# Patient Record
Sex: FEMALE | Race: OTHER | NOT HISPANIC OR LATINO | URBAN - METROPOLITAN AREA
[De-identification: names, ages, dates, MRNs, and addresses within clinical notes are randomized per-mention and may not be internally consistent; named-entity substitution may affect disease eponyms.]

---

## 2018-09-19 ENCOUNTER — INPATIENT (INPATIENT)
Facility: HOSPITAL | Age: 53
LOS: 50 days | Discharge: HOME | End: 2018-11-09
Attending: PSYCHIATRY & NEUROLOGY | Admitting: PSYCHIATRY & NEUROLOGY
Payer: MEDICAID

## 2018-09-19 VITALS
DIASTOLIC BLOOD PRESSURE: 141 MMHG | OXYGEN SATURATION: 100 % | TEMPERATURE: 99 F | SYSTOLIC BLOOD PRESSURE: 208 MMHG | RESPIRATION RATE: 16 BRPM

## 2018-09-19 DIAGNOSIS — F20.9 SCHIZOPHRENIA, UNSPECIFIED: ICD-10-CM

## 2018-09-19 DIAGNOSIS — F25.9 SCHIZOAFFECTIVE DISORDER, UNSPECIFIED: ICD-10-CM

## 2018-09-19 DIAGNOSIS — R69 ILLNESS, UNSPECIFIED: ICD-10-CM

## 2018-09-19 LAB
ALBUMIN SERPL ELPH-MCNC: 4.5 G/DL — SIGNIFICANT CHANGE UP (ref 3.3–5)
ALP SERPL-CCNC: 76 U/L — SIGNIFICANT CHANGE UP (ref 40–120)
ALT FLD-CCNC: 28 U/L — SIGNIFICANT CHANGE UP (ref 4–33)
APAP SERPL-MCNC: < 15 UG/ML — LOW (ref 15–25)
AST SERPL-CCNC: 22 U/L — SIGNIFICANT CHANGE UP (ref 4–32)
BASOPHILS # BLD AUTO: 0.01 K/UL — SIGNIFICANT CHANGE UP (ref 0–0.2)
BASOPHILS NFR BLD AUTO: 0.1 % — SIGNIFICANT CHANGE UP (ref 0–2)
BILIRUB SERPL-MCNC: < 0.2 MG/DL — LOW (ref 0.2–1.2)
BUN SERPL-MCNC: 7 MG/DL — SIGNIFICANT CHANGE UP (ref 7–23)
CALCIUM SERPL-MCNC: 9.3 MG/DL — SIGNIFICANT CHANGE UP (ref 8.4–10.5)
CHLORIDE SERPL-SCNC: 103 MMOL/L — SIGNIFICANT CHANGE UP (ref 98–107)
CO2 SERPL-SCNC: 25 MMOL/L — SIGNIFICANT CHANGE UP (ref 22–31)
CREAT SERPL-MCNC: 0.62 MG/DL — SIGNIFICANT CHANGE UP (ref 0.5–1.3)
EOSINOPHIL # BLD AUTO: 0 K/UL — SIGNIFICANT CHANGE UP (ref 0–0.5)
EOSINOPHIL NFR BLD AUTO: 0 % — SIGNIFICANT CHANGE UP (ref 0–6)
ETHANOL BLD-MCNC: < 10 MG/DL — SIGNIFICANT CHANGE UP
GLUCOSE SERPL-MCNC: 168 MG/DL — HIGH (ref 70–99)
HCT VFR BLD CALC: 40.8 % — SIGNIFICANT CHANGE UP (ref 34.5–45)
HGB BLD-MCNC: 12.8 G/DL — SIGNIFICANT CHANGE UP (ref 11.5–15.5)
IMM GRANULOCYTES # BLD AUTO: 0.04 # — SIGNIFICANT CHANGE UP
IMM GRANULOCYTES NFR BLD AUTO: 0.5 % — SIGNIFICANT CHANGE UP (ref 0–1.5)
LYMPHOCYTES # BLD AUTO: 2.11 K/UL — SIGNIFICANT CHANGE UP (ref 1–3.3)
LYMPHOCYTES # BLD AUTO: 24.8 % — SIGNIFICANT CHANGE UP (ref 13–44)
MCHC RBC-ENTMCNC: 26.4 PG — LOW (ref 27–34)
MCHC RBC-ENTMCNC: 31.4 % — LOW (ref 32–36)
MCV RBC AUTO: 84.1 FL — SIGNIFICANT CHANGE UP (ref 80–100)
MONOCYTES # BLD AUTO: 0.35 K/UL — SIGNIFICANT CHANGE UP (ref 0–0.9)
MONOCYTES NFR BLD AUTO: 4.1 % — SIGNIFICANT CHANGE UP (ref 2–14)
NEUTROPHILS # BLD AUTO: 6.01 K/UL — SIGNIFICANT CHANGE UP (ref 1.8–7.4)
NEUTROPHILS NFR BLD AUTO: 70.5 % — SIGNIFICANT CHANGE UP (ref 43–77)
NRBC # FLD: 0 — SIGNIFICANT CHANGE UP
PLATELET # BLD AUTO: 304 K/UL — SIGNIFICANT CHANGE UP (ref 150–400)
PMV BLD: 12.1 FL — SIGNIFICANT CHANGE UP (ref 7–13)
POTASSIUM SERPL-MCNC: 3.9 MMOL/L — SIGNIFICANT CHANGE UP (ref 3.5–5.3)
POTASSIUM SERPL-SCNC: 3.9 MMOL/L — SIGNIFICANT CHANGE UP (ref 3.5–5.3)
PROT SERPL-MCNC: 8.2 G/DL — SIGNIFICANT CHANGE UP (ref 6–8.3)
RBC # BLD: 4.85 M/UL — SIGNIFICANT CHANGE UP (ref 3.8–5.2)
RBC # FLD: 14.7 % — HIGH (ref 10.3–14.5)
SALICYLATES SERPL-MCNC: < 5 MG/DL — LOW (ref 15–30)
SODIUM SERPL-SCNC: 143 MMOL/L — SIGNIFICANT CHANGE UP (ref 135–145)
TSH SERPL-MCNC: 1.32 UIU/ML — SIGNIFICANT CHANGE UP (ref 0.27–4.2)
WBC # BLD: 8.52 K/UL — SIGNIFICANT CHANGE UP (ref 3.8–10.5)
WBC # FLD AUTO: 8.52 K/UL — SIGNIFICANT CHANGE UP (ref 3.8–10.5)

## 2018-09-19 PROCEDURE — 99285 EMERGENCY DEPT VISIT HI MDM: CPT

## 2018-09-19 RX ORDER — AMLODIPINE BESYLATE 2.5 MG/1
5 TABLET ORAL ONCE
Qty: 0 | Refills: 0 | Status: DISCONTINUED | OUTPATIENT
Start: 2018-09-19 | End: 2018-09-19

## 2018-09-19 RX ORDER — SODIUM CHLORIDE 9 MG/ML
1000 INJECTION INTRAMUSCULAR; INTRAVENOUS; SUBCUTANEOUS ONCE
Qty: 0 | Refills: 0 | Status: DISCONTINUED | OUTPATIENT
Start: 2018-09-19 | End: 2018-09-20

## 2018-09-19 RX ORDER — HALOPERIDOL DECANOATE 100 MG/ML
5 INJECTION INTRAMUSCULAR ONCE
Qty: 0 | Refills: 0 | Status: DISCONTINUED | OUTPATIENT
Start: 2018-09-19 | End: 2018-11-09

## 2018-09-19 RX ORDER — SODIUM CHLORIDE 9 MG/ML
1000 INJECTION INTRAMUSCULAR; INTRAVENOUS; SUBCUTANEOUS ONCE
Qty: 0 | Refills: 0 | Status: COMPLETED | OUTPATIENT
Start: 2018-09-19 | End: 2018-09-19

## 2018-09-19 RX ORDER — HALOPERIDOL DECANOATE 100 MG/ML
5 INJECTION INTRAMUSCULAR EVERY 6 HOURS
Qty: 0 | Refills: 0 | Status: DISCONTINUED | OUTPATIENT
Start: 2018-09-19 | End: 2018-11-09

## 2018-09-19 RX ADMIN — SODIUM CHLORIDE 1000 MILLILITER(S): 9 INJECTION INTRAMUSCULAR; INTRAVENOUS; SUBCUTANEOUS at 21:55

## 2018-09-19 RX ADMIN — AMLODIPINE BESYLATE 5 MILLIGRAM(S): 2.5 TABLET ORAL at 14:53

## 2018-09-19 RX ADMIN — Medication 2 MILLIGRAM(S): at 17:25

## 2018-09-19 NOTE — ED PROVIDER NOTE - PROGRESS NOTE DETAILS
Pt calm. Sitting in  hallway chair. ARANZA BP elevated on electronic BP. Pt refused repeat on other arm. Denies CP/SOB/HA. FALKOWSKA. Pt labs unremarkable, asymptomatic.  , will get ECG

## 2018-09-19 NOTE — ED BEHAVIORAL HEALTH ASSESSMENT NOTE - DESCRIPTION
a bit irritable, minimally cooperative, in good behavioral control    Vital Signs Last 24 Hrs  T(C): 36.7 (19 Sep 2018 10:49), Max: 37.1 (19 Sep 2018 06:35)  T(F): 98.1 (19 Sep 2018 10:49), Max: 98.8 (19 Sep 2018 06:35)  HR: 111 (19 Sep 2018 10:49) (110 - 111)  BP: 194/101 (19 Sep 2018 10:49) (159/112 - 208/141)  BP(mean): --  RR: 16 (19 Sep 2018 10:49) (16 - 17)  SpO2: 100% (19 Sep 2018 10:49) (100% - 100%) ?hypertension? see HPI

## 2018-09-19 NOTE — ED PROVIDER NOTE - CHIEF COMPLAINT
The patient is a 53y Female complaining of The patient is a 53y Female complaining of disorganized behavior.

## 2018-09-19 NOTE — ED ADULT TRIAGE NOTE - CHIEF COMPLAINT QUOTE
pt brought from home by EMS/PD for medication non-compliant, as per EMS family called when pt did not recognize them, EMS states living situation unkempt, calm on presentation, uncooperative, refusing vitals, EMS states family denies medical history

## 2018-09-19 NOTE — ED ADULT NURSE NOTE - OBJECTIVE STATEMENT
report received from Franky churchill RN. pt brought in by ems and police for noncompliance with medications. pt currently calm and cooperative. pt whispering to herself in chair. denies SI, HI, visual or auditory hallucinations. Denies drug or alcohol use. Awaiting further orders. environment checked for safety. will continue to monitor.

## 2018-09-19 NOTE — ED PROVIDER NOTE - OBJECTIVE STATEMENT
stoped taking rispedal, daughters called house filthy, pt denies 53F unknown PMH BIBEMS after police called to residence by pt's daughter's due to concern of psych medication non-compliance with Risperdal. Reportedly patient not recognizing daughters. EMS reports home is disarray and filthy. Pt denies any medical history. Pt refuses vital signs and physical exam. Denies SI/HI/AVH. Denies tobacco/alcohol/illicit drug use.

## 2018-09-19 NOTE — ED BEHAVIORAL HEALTH ASSESSMENT NOTE - SUMMARY
54 y/o female, single, noncaregiver, lives alone, unemployed, with history of psychotic disorder, multiple past psych hospitalizations, currently noncompliant with ?Risperdal?, no known history of suicide attempts or violence, PMH ?hypertension?, no known history of substance abuse, BIBEMS activated by pt's daughter (Anuradha) for acute psychotic symptoms in context of likely medication noncompliance.    Per collateral, pt has been talking to herself, disorganized, delusional, verbally aggressive and agitated, and her home is in disarray. Pt presents internally preoccupied, thought blocked, disorganized, irritable.  Pt currently unable to care for self due to severity of psychotic symptoms and requires inpatient psychiatric admission for safety and stabilization.

## 2018-09-19 NOTE — ED BEHAVIORAL HEALTH ASSESSMENT NOTE - HPI (INCLUDE ILLNESS QUALITY, SEVERITY, DURATION, TIMING, CONTEXT, MODIFYING FACTORS, ASSOCIATED SIGNS AND SYMPTOMS)
55 y/o female 55 y/o female    On approach, pt noted to be talking to herself. Pt is minimally cooperative with interview, stating "it hurts to look up." She appears internally preoccupied and thought blocked. Pt says she was brought to the hospital because "I was sleeping and the two kids came to check on me." When asked to elaborate, she responds "I don't know. Just two kids." She does not know who called 911.     Collateral obtained from pt's daughter, Anuradha, who reports 54 y/o female    Per EMS, pt's daughter activated 911 due to concern for noncompliance with Risperdal, patient not recognizing her daughters, and pt's home is in disarray and filthy.    On approach, pt noted to be talking to herself. Pt is minimally cooperative with interview, stating "it hurts to look up." She appears internally preoccupied and thought blocked. Pt says she was brought to the hospital because "I was sleeping and the two kids came to check on me." When asked to elaborate, she responds "I don't know. Just two kids." She does not know who called 911.     Collateral obtained from pt's daughter, Anuradha, who reports 52 y/o female    Per EMS, pt's daughter activated 911 due to concern for noncompliance with Risperdal, patient not recognizing her daughters, and pt's home is in disarray and filthy.    On approach, pt noted to be talking to herself. Pt is minimally cooperative with interview, stating "it hurts to look up." She appears internally preoccupied and thought blocked. Pt says she was brought to the hospital because "I was sleeping and the two kids came to check on me." When asked to elaborate, she responds "I don't know. Just two kids." She does not know who called 911. Pt refused to answer further questions.     Per collateral from patient’s daughter Anuradha Gillette ([phone]):  "Patient is a 53 year old female, domiciled alone, unemployed, BIBEMS activated by daughter for patient’s symptoms worsening. Ms. Gillette states that she went to see the patient yesterday and she noticed that the patient’s symptoms has been worsening because she was talking to herself and laughing inappropriately. Ms. Gillette reports that the patient was telling her that she felt the bed rocking when no one was rocking her bed. Ms. Gillette states that the patient normally does not exhibit these behaviors and she believes that the patient has been non-compliant with her medications (unsure what medications the patient is currently taking). Ms. Gillette states that when she went to see the patient yesterday that the patient stated that she was unable to recognize her and tell her who she was. Ms. Gillette states that the patient has been verbally aggressive towards her and she has been shouting and yelling. Ms. Gillette states this is unlike the patient’s baseline and usually she is calm and can verbally express who she feels. She states that the patient was last hospitalized over a year and a half ago at [facility]. She denies that the patient is having SI/HI/ VH/SIB. She states that the patient the patient is not on any drugs or alcohol and she is not sure where the patient follows up for her outpatient treatment. The patient has no medical issues, allergies, or legal issues. She states that the patient’s home is in disarray and the patient usually keeps her home tidy. Ms. Gillette states that the patient’s home was not tidy and she had garbage that she did not take out of the home. She states that the patient has been eating and showering to her knowledge but was uncertain in regards to the patient’s sleeping. Ms. Gillette states she is concerned because the patient has been whispering to herself which is not her baseline." 52 y/o female, single, noncaregiver, lives alone, unemployed, with history of psychotic disorder, multiple past psych hospitalizations, currently noncompliant with ?Risperdal?, no known history of suicide attempts or violence, PMH ?hypertension?, no known history of substance abuse, BIBEMS activated by pt's daughter (Anuradha) for acute psychotic symptoms in context of likely medication noncompliance.     Per EMS, pt's daughter activated 911 due to concern for noncompliance with Risperdal, patient not recognizing her daughters, and pt's home is in disarray and filthy.    On approach, pt noted to be talking to herself. Pt is minimally cooperative with interview, stating "it hurts to look up." She appears internally preoccupied and thought blocked. Pt says she was brought to the hospital because "I was sleeping and the two kids came to check on me." When asked to elaborate, she responds "I don't know. Just two kids." She does not know who called 911. Pt refused to answer further questions.     Per collateral from patient’s daughter Anuradha Gillette ([phone]):  "Patient is a 53 year old female, domiciled alone, unemployed, BIBEMS activated by daughter for patient’s symptoms worsening. Ms. Gillette states that she went to see the patient yesterday and she noticed that the patient’s symptoms has been worsening because she was talking to herself and laughing inappropriately. Ms. Gillette reports that the patient was telling her that she felt the bed rocking when no one was rocking her bed. Ms. Gillette states that the patient normally does not exhibit these behaviors and she believes that the patient has been non-compliant with her medications (unsure what medications the patient is currently taking). Ms. Gillette states that when she went to see the patient yesterday that the patient stated that she was unable to recognize her and tell her who she was. Ms. Gillette states that the patient has been verbally aggressive towards her and she has been shouting and yelling. Ms. Gillette states this is unlike the patient’s baseline and usually she is calm and can verbally express how she feels. She states that the patient was last hospitalized over a year and a half ago at [facility]. She denies that the patient is having SI/HI/ VH/SIB. She states that the patient the patient is not on any drugs or alcohol and she is not sure where the patient follows up for her outpatient treatment. The patient has no medical issues, allergies, or legal issues. She states that the patient’s home is in disarray and the patient usually keeps her home tidy. Ms. Gillette states that the patient’s home was not tidy and she had garbage that she did not take out of the home. She states that the patient has been eating and showering to her knowledge but was uncertain in regards to the patient’s sleeping. Ms. Gillette states she is concerned because the patient has been whispering to herself which is not her baseline."

## 2018-09-19 NOTE — ED ADULT NURSE REASSESSMENT NOTE - NS ED NURSE REASSESS COMMENT FT1
pt observed talking to self disorganized at times denies Si/Hi/AVh presently comfort & safety maintained eval on going.

## 2018-09-19 NOTE — ED ADULT NURSE NOTE - NSIMPLEMENTINTERV_GEN_ALL_ED
Implemented All Universal Safety Interventions:  [place] to call system. Call bell, personal items and telephone within reach. Instruct patient to call for assistance. Room bathroom lighting operational. Non-slip footwear when patient is off stretcher. Physically safe environment: no spills, clutter or unnecessary equipment. Stretcher in lowest position, wheels locked, appropriate side rails in place.

## 2018-09-19 NOTE — ED ADULT NURSE REASSESSMENT NOTE - NS ED NURSE REASSESS COMMENT FT1
pt calm in nad denies Si/Hi presently pt tolerated dinner no c/o verbalized safety & comfort measures maintained eval on going.

## 2018-09-19 NOTE — ED BEHAVIORAL HEALTH NOTE - BEHAVIORAL HEALTH NOTE
Worker spoke to patient’s daughter Caryn Gillette ([phone]) for collateral information. All information is as per Ms. Gillette:    Patient is a 53 year old female, domiciled alone, unemployed, BIBEMS activated by daughter for patient’s symptoms worsening. Ms. Gillette states that she went to see the patient yesterday and she noticed that the patient’s symptoms has been worsening because she was talking to herself and laughing inappropriately. Ms. Gillette reports that the patient was telling her that she felt the bed rocking when no one was rocking her bed. Ms. Nain states that the patient normally does not exhibit these behaviors and she believes that the patient has been non-compliant with her medications (unsure what medications the patient is currently taking). Ms. Gillette states that when she went to see the patient yesterday that the patient stated that she was unable to recognize her and tell her who she was. Ms. Gillette states that the patient has been verbally aggressive towards her and she has been shouting and yelling. Ms. Gillette states this is unlike the patient’s baseline and usually she is calm and can verbally express who she feels. She states that the patient was last hospitalized over a year and a half ago at [facility]. She denies that the patient is having SI/HI/ VH/SIB. She states that the patient the patient is not on any drugs or alcohol and she is not sure where the patient follows up for her outpatient treatment. The patient has no medical issues, allergies, or legal issues. She states that the patient’s home is in disarray and the patient usually keeps her home tidy. Ms. Gillette states that the patient’s home was not tidy and she had garbage that she did not take out of the home. She states that the patient has been eating and showering to her knowledge but was uncertain in regards to the patient’s sleeping. Ms. Nain states she is concerned because the patient has been whispering to herself which is not her baseline. Patient will be admitted to  for further stabilization. Worker informed patient’s daughter of admission to  and provided unit information.

## 2018-09-19 NOTE — ED ADULT NURSE REASSESSMENT NOTE - NS ED NURSE REASSESS COMMENT FT1
Received report from night RN pt sitting in hallway in nad  pt denies Si/Hi/AVh presently, pt calm & cooperative  tolerated breakfast safety & comfort measures maintained  medical attending  made aware of bp awaiting further orders  eval on going.

## 2018-09-19 NOTE — ED ADULT NURSE REASSESSMENT NOTE - NS ED NURSE REASSESS COMMENT FT1
pt calm & cooperative in nad sitting in hallway  pt denies Si/Hi/AVh presently comfort & safety measures maintained eval on going.

## 2018-09-20 PROCEDURE — 99221 1ST HOSP IP/OBS SF/LOW 40: CPT

## 2018-09-20 PROCEDURE — 99223 1ST HOSP IP/OBS HIGH 75: CPT

## 2018-09-20 RX ORDER — AMLODIPINE BESYLATE 2.5 MG/1
5 TABLET ORAL ONCE
Qty: 0 | Refills: 0 | Status: COMPLETED | OUTPATIENT
Start: 2018-09-20 | End: 2018-09-20

## 2018-09-20 RX ORDER — AMLODIPINE BESYLATE 2.5 MG/1
2.5 TABLET ORAL ONCE
Qty: 0 | Refills: 0 | Status: COMPLETED | OUTPATIENT
Start: 2018-09-20 | End: 2018-09-20

## 2018-09-20 RX ORDER — AMLODIPINE BESYLATE 2.5 MG/1
2.5 TABLET ORAL DAILY
Qty: 0 | Refills: 0 | Status: DISCONTINUED | OUTPATIENT
Start: 2018-09-21 | End: 2018-09-22

## 2018-09-20 RX ORDER — RISPERIDONE 4 MG/1
1 TABLET ORAL AT BEDTIME
Qty: 0 | Refills: 0 | Status: DISCONTINUED | OUTPATIENT
Start: 2018-09-20 | End: 2018-09-21

## 2018-09-20 RX ADMIN — AMLODIPINE BESYLATE 5 MILLIGRAM(S): 2.5 TABLET ORAL at 12:08

## 2018-09-20 RX ADMIN — AMLODIPINE BESYLATE 2.5 MILLIGRAM(S): 2.5 TABLET ORAL at 01:50

## 2018-09-20 NOTE — CONSULT NOTE ADULT - SUBJECTIVE AND OBJECTIVE BOX
Patient was seen and evaluated by me at 11:45 AM on 9/20/18  HPI: Pt is 53F admitted to [facility] 9/19/18 for decompensated schizophrenia.  She had elevated BP on presentation in ED and was treated with norvasc.  BP normalized but was elevated again today.  Per chart review no medical problems known to pt's daughter. Patient denies medical problems, treatment for hypertension, and denies using any pharmacy for medications.  However, patient is psychotic, stating "you can help me when you get me an appointment in the [facility]."      PAST MEDICAL & SURGICAL HISTORY:  No pertinent past medical history (patient denies)  No significant past surgical history (patient denies)      Review of Systems:   CONSTITUTIONAL: No fever, weight loss, or fatigue  EYES: No eye pain, visual disturbances, or discharge  ENMT:  No difficulty hearing, tinnitus, vertigo; No sinus or throat pain  NECK: No pain or stiffness  RESPIRATORY: No cough, wheezing, chills or hemoptysis; No shortness of breath  CARDIOVASCULAR: No chest pain, palpitations, dizziness, or leg swelling  GASTROINTESTINAL: No abdominal or epigastric pain. No nausea, vomiting, or hematemesis; No diarrhea or constipation. No melena or hematochezia.  GENITOURINARY: No dysuria, frequency, hematuria, or incontinence  NEUROLOGICAL: No headaches, memory loss, loss of strength, numbness, or tremors  SKIN: No itching, burning, rashes, or lesions   LYMPH NODES: No enlarged glands  ENDOCRINE: No heat or cold intolerance; No hair loss  MUSCULOSKELETAL: No joint pain or swelling; No muscle, back, or extremity pain  HEME/LYMPH: No easy bruising, or bleeding gums  ALLERY AND IMMUNOLOGIC: No hives or eczema    Allergies    No Known Allergies    Intolerances        Social History: denies etoh tob idu    FAMILY HISTORY: noncontributory      MEDICATIONS  (STANDING):  amLODIPine   Tablet 2.5 milliGRAM(s) Oral daily  haloperidol    Injectable 5 milliGRAM(s) IntraMuscular once  LORazepam   Injectable 2 milliGRAM(s) IntraMuscular once  risperiDONE   Tablet 1 milliGRAM(s) Oral at bedtime    MEDICATIONS  (PRN):  haloperidol     Tablet 5 milliGRAM(s) Oral every 6 hours PRN agitation  LORazepam     Tablet 2 milliGRAM(s) Oral every 6 hours PRN Agitation      Vital Signs Last 24 Hrs  T(F): afeb  HR: 119 (20 Sep 2018 17:25) (119 - 119)  BP: 155/99 (20 Sep 2018 17:25) (155/99 - 155/99)  BP(mean): --  RR: 15  SpO2: --  CAPILLARY BLOOD GLUCOSE            PHYSICAL EXAM:  GENERAL: NAD, well-developed  HEAD:  Atraumatic, Normocephalic  EYES: EOMI, conjunctiva and sclera clear  NECK: Supple, No JVD  CHEST/LUNG: breathing unlabored, declined auscultation  HEART: declined auscultation  ABDOMEN: Nondistended  EXTREMITIES:  2+ Peripheral Pulses, No clubbing, cyanosis, or edema  NEUROLOGY: non-focal  SKIN: No rashes or lesions    LABS:                        12.8   8.52  )-----------( 304      ( 19 Sep 2018 11:32 )             40.8     09-19    143  |  103  |  7   ----------------------------<  168<H>  3.9   |  25  |  0.62    Ca    9.3      19 Sep 2018 11:32    TPro  8.2  /  Alb  4.5  /  TBili  < 0.2<L>  /  DBili  x   /  AST  22  /  ALT  28  /  AlkPhos  76  09-19              EKG(personally reviewed): , LVH         Care Discussed with Consultants/Other Providers: Dr King

## 2018-09-20 NOTE — CONSULT NOTE ADULT - ASSESSMENT
53F decompensated schizophrenia with psychosis; elevated BP    Plan:  Elevated BP: Unknown whether patient has essential hypertension.  Suggestion of LVH on EKG argues that patient may have essential hypertension.  However elevated BP may also be due to psychosis/agitation.  BP normalized in ED after low dose norvasc.  Will continue this with hold parameters.  Continue to monitor BPs.    Schizophrenia: Management per primary team.

## 2018-09-21 PROCEDURE — 99232 SBSQ HOSP IP/OBS MODERATE 35: CPT

## 2018-09-21 RX ORDER — RISPERIDONE 4 MG/1
1 TABLET ORAL AT BEDTIME
Qty: 0 | Refills: 0 | Status: DISCONTINUED | OUTPATIENT
Start: 2018-09-21 | End: 2018-09-24

## 2018-09-21 RX ADMIN — AMLODIPINE BESYLATE 2.5 MILLIGRAM(S): 2.5 TABLET ORAL at 10:05

## 2018-09-22 PROCEDURE — 99232 SBSQ HOSP IP/OBS MODERATE 35: CPT

## 2018-09-22 RX ORDER — AMLODIPINE BESYLATE 2.5 MG/1
5 TABLET ORAL DAILY
Qty: 0 | Refills: 0 | Status: DISCONTINUED | OUTPATIENT
Start: 2018-09-22 | End: 2018-11-01

## 2018-09-22 RX ADMIN — AMLODIPINE BESYLATE 5 MILLIGRAM(S): 2.5 TABLET ORAL at 10:30

## 2018-09-22 RX ADMIN — RISPERIDONE 1 MILLIGRAM(S): 4 TABLET ORAL at 21:22

## 2018-09-22 NOTE — CHART NOTE - NSCHARTNOTEFT_GEN_A_CORE
LAMBERTO called for patient with /109 on AM VS.  Patient was evaluated by hospitalist yesterday and started Norvasc 2.5mg PO daily.  Patient has been receiving PRN Norvasc in ED and on the unit to control BP prior to this as well.  Patient seen and examined.  She states she feels well and has no complaints.  Denies nausea, vomiting, double vision, headache, abdominal pain.  She refuses physical exam.  She is alert and oriented to location, month, and year.  She is irritable and refuses to state her name or say why she is in the hospital.  She has not received Norvasc dose this morning.    Temp (F): 98.6 Degrees F  Temp (C) Temp (C): 37 Degrees C  Heart Rate Heart Rate (beats/min): 119 /min  BP Systolic Systolic: 198 mm Hg  BP Diastolic Diastolic (mm Hg): 109  Respiration Rate (breaths/min) Respiration Rate (breaths/min): 16 /min    Repeat BP with manual manometer was 174/104    53F decompensated schizophrenia with psychosis; elevated BP.  There is no sign of end organ damage to suggest hypertensive emergency and BP previously normalized with Norvasc.      Plan:  Elevated BP: Unknown whether patient has essential hypertension. Increased standing Norvasc dose to 5mg daily.  Will re-evaluate BP later today.

## 2018-09-23 PROCEDURE — 99232 SBSQ HOSP IP/OBS MODERATE 35: CPT

## 2018-09-23 RX ORDER — RISPERIDONE 4 MG/1
1 TABLET ORAL ONCE
Qty: 0 | Refills: 0 | Status: COMPLETED | OUTPATIENT
Start: 2018-09-23 | End: 2018-09-23

## 2018-09-23 RX ADMIN — RISPERIDONE 1 MILLIGRAM(S): 4 TABLET ORAL at 12:18

## 2018-09-23 RX ADMIN — AMLODIPINE BESYLATE 5 MILLIGRAM(S): 2.5 TABLET ORAL at 09:21

## 2018-09-24 PROCEDURE — 99232 SBSQ HOSP IP/OBS MODERATE 35: CPT

## 2018-09-24 RX ORDER — RISPERIDONE 4 MG/1
2 TABLET ORAL AT BEDTIME
Qty: 0 | Refills: 0 | Status: DISCONTINUED | OUTPATIENT
Start: 2018-09-24 | End: 2018-09-30

## 2018-09-24 RX ADMIN — RISPERIDONE 2 MILLIGRAM(S): 4 TABLET ORAL at 21:22

## 2018-09-25 RX ADMIN — AMLODIPINE BESYLATE 5 MILLIGRAM(S): 2.5 TABLET ORAL at 10:24

## 2018-09-26 PROCEDURE — 99232 SBSQ HOSP IP/OBS MODERATE 35: CPT

## 2018-09-26 RX ADMIN — Medication 2 MILLIGRAM(S): at 00:34

## 2018-09-27 PROCEDURE — 99232 SBSQ HOSP IP/OBS MODERATE 35: CPT

## 2018-09-27 RX ORDER — RISPERIDONE 4 MG/1
1 TABLET ORAL DAILY
Qty: 0 | Refills: 0 | Status: DISCONTINUED | OUTPATIENT
Start: 2018-09-28 | End: 2018-09-30

## 2018-09-27 RX ORDER — RISPERIDONE 4 MG/1
1 TABLET ORAL ONCE
Qty: 0 | Refills: 0 | Status: COMPLETED | OUTPATIENT
Start: 2018-09-27 | End: 2018-09-27

## 2018-09-27 RX ADMIN — RISPERIDONE 1 MILLIGRAM(S): 4 TABLET ORAL at 11:18

## 2018-09-28 PROCEDURE — 99232 SBSQ HOSP IP/OBS MODERATE 35: CPT

## 2018-09-29 RX ORDER — HALOPERIDOL DECANOATE 100 MG/ML
5 INJECTION INTRAMUSCULAR ONCE
Qty: 0 | Refills: 0 | Status: DISCONTINUED | OUTPATIENT
Start: 2018-09-29 | End: 2018-11-09

## 2018-09-30 RX ORDER — RISPERIDONE 4 MG/1
2 TABLET ORAL ONCE
Qty: 0 | Refills: 0 | Status: COMPLETED | OUTPATIENT
Start: 2018-09-30 | End: 2018-09-30

## 2018-09-30 RX ORDER — RISPERIDONE 4 MG/1
3 TABLET ORAL DAILY
Qty: 0 | Refills: 0 | Status: DISCONTINUED | OUTPATIENT
Start: 2018-10-01 | End: 2018-10-16

## 2018-09-30 RX ADMIN — RISPERIDONE 1 MILLIGRAM(S): 4 TABLET ORAL at 10:18

## 2018-10-01 PROCEDURE — 99232 SBSQ HOSP IP/OBS MODERATE 35: CPT

## 2018-10-02 PROCEDURE — 99232 SBSQ HOSP IP/OBS MODERATE 35: CPT

## 2018-10-03 PROCEDURE — 99232 SBSQ HOSP IP/OBS MODERATE 35: CPT

## 2018-10-04 PROCEDURE — 99232 SBSQ HOSP IP/OBS MODERATE 35: CPT

## 2018-10-05 PROCEDURE — 99232 SBSQ HOSP IP/OBS MODERATE 35: CPT

## 2018-10-08 PROCEDURE — 99232 SBSQ HOSP IP/OBS MODERATE 35: CPT

## 2018-10-09 PROCEDURE — 99232 SBSQ HOSP IP/OBS MODERATE 35: CPT

## 2018-10-09 RX ORDER — HALOPERIDOL DECANOATE 100 MG/ML
5 INJECTION INTRAMUSCULAR ONCE
Qty: 0 | Refills: 0 | Status: COMPLETED | OUTPATIENT
Start: 2018-10-09 | End: 2018-10-09

## 2018-10-09 RX ADMIN — Medication 2 MILLIGRAM(S): at 10:56

## 2018-10-09 RX ADMIN — HALOPERIDOL DECANOATE 5 MILLIGRAM(S): 100 INJECTION INTRAMUSCULAR at 10:56

## 2018-10-10 PROCEDURE — 99232 SBSQ HOSP IP/OBS MODERATE 35: CPT

## 2018-10-11 PROCEDURE — 99232 SBSQ HOSP IP/OBS MODERATE 35: CPT

## 2018-10-12 PROCEDURE — 99232 SBSQ HOSP IP/OBS MODERATE 35: CPT

## 2018-10-15 PROCEDURE — 99232 SBSQ HOSP IP/OBS MODERATE 35: CPT

## 2018-10-15 RX ORDER — HALOPERIDOL DECANOATE 100 MG/ML
5 INJECTION INTRAMUSCULAR ONCE
Qty: 0 | Refills: 0 | Status: COMPLETED | OUTPATIENT
Start: 2018-10-15 | End: 2018-10-15

## 2018-10-15 RX ORDER — DIPHENHYDRAMINE HCL 50 MG
50 CAPSULE ORAL EVERY 6 HOURS
Qty: 0 | Refills: 0 | Status: COMPLETED | OUTPATIENT
Start: 2018-10-15 | End: 2018-10-15

## 2018-10-15 RX ADMIN — Medication 2 MILLIGRAM(S): at 15:02

## 2018-10-15 RX ADMIN — Medication 50 MILLIGRAM(S): at 15:02

## 2018-10-15 RX ADMIN — HALOPERIDOL DECANOATE 5 MILLIGRAM(S): 100 INJECTION INTRAMUSCULAR at 15:02

## 2018-10-16 PROCEDURE — 99232 SBSQ HOSP IP/OBS MODERATE 35: CPT

## 2018-10-16 RX ORDER — RISPERIDONE 4 MG/1
2 TABLET ORAL AT BEDTIME
Qty: 0 | Refills: 0 | Status: DISCONTINUED | OUTPATIENT
Start: 2018-10-16 | End: 2018-10-18

## 2018-10-16 RX ORDER — HALOPERIDOL DECANOATE 100 MG/ML
5 INJECTION INTRAMUSCULAR ONCE
Qty: 0 | Refills: 0 | Status: DISCONTINUED | OUTPATIENT
Start: 2018-10-16 | End: 2018-11-09

## 2018-10-16 RX ORDER — DIPHENHYDRAMINE HCL 50 MG
50 CAPSULE ORAL ONCE
Qty: 0 | Refills: 0 | Status: DISCONTINUED | OUTPATIENT
Start: 2018-10-16 | End: 2018-11-09

## 2018-10-16 RX ADMIN — RISPERIDONE 2 MILLIGRAM(S): 4 TABLET ORAL at 23:35

## 2018-10-17 PROCEDURE — 99232 SBSQ HOSP IP/OBS MODERATE 35: CPT

## 2018-10-17 RX ADMIN — RISPERIDONE 2 MILLIGRAM(S): 4 TABLET ORAL at 21:37

## 2018-10-18 PROCEDURE — 99232 SBSQ HOSP IP/OBS MODERATE 35: CPT

## 2018-10-18 RX ORDER — RISPERIDONE 4 MG/1
3 TABLET ORAL AT BEDTIME
Qty: 0 | Refills: 0 | Status: DISCONTINUED | OUTPATIENT
Start: 2018-10-18 | End: 2018-10-25

## 2018-10-18 RX ADMIN — RISPERIDONE 3 MILLIGRAM(S): 4 TABLET ORAL at 21:42

## 2018-10-18 NOTE — CHART NOTE - NSCHARTNOTEFT_GEN_A_CORE
54 yo F is consulted after staff notice bruising on pts R hand. Pt is disorganized and unable to specify what happened to the hand. There is no bruising or swelling noted on the R hand.  Pt is noted to have marker over her hands. Pt denies having pain. No open cuts noted. Pt not cooperative with answering questions, but pt has FROM of the R hand. Pulses and sensation intact.  Refuses any pain meds at this time.

## 2018-10-19 PROCEDURE — 99232 SBSQ HOSP IP/OBS MODERATE 35: CPT

## 2018-10-19 RX ORDER — RISPERIDONE 4 MG/1
1 TABLET ORAL DAILY
Qty: 0 | Refills: 0 | Status: DISCONTINUED | OUTPATIENT
Start: 2018-10-20 | End: 2018-10-22

## 2018-10-19 RX ADMIN — RISPERIDONE 3 MILLIGRAM(S): 4 TABLET ORAL at 22:48

## 2018-10-20 RX ADMIN — AMLODIPINE BESYLATE 5 MILLIGRAM(S): 2.5 TABLET ORAL at 09:02

## 2018-10-20 RX ADMIN — RISPERIDONE 1 MILLIGRAM(S): 4 TABLET ORAL at 09:02

## 2018-10-21 RX ADMIN — RISPERIDONE 1 MILLIGRAM(S): 4 TABLET ORAL at 10:25

## 2018-10-21 RX ADMIN — RISPERIDONE 3 MILLIGRAM(S): 4 TABLET ORAL at 21:50

## 2018-10-21 RX ADMIN — AMLODIPINE BESYLATE 5 MILLIGRAM(S): 2.5 TABLET ORAL at 10:25

## 2018-10-22 PROCEDURE — 99232 SBSQ HOSP IP/OBS MODERATE 35: CPT

## 2018-10-22 RX ORDER — RISPERIDONE 4 MG/1
3 TABLET ORAL DAILY
Qty: 0 | Refills: 0 | Status: DISCONTINUED | OUTPATIENT
Start: 2018-10-23 | End: 2018-10-26

## 2018-10-22 RX ADMIN — RISPERIDONE 1 MILLIGRAM(S): 4 TABLET ORAL at 09:54

## 2018-10-22 RX ADMIN — AMLODIPINE BESYLATE 5 MILLIGRAM(S): 2.5 TABLET ORAL at 09:54

## 2018-10-22 RX ADMIN — RISPERIDONE 3 MILLIGRAM(S): 4 TABLET ORAL at 22:44

## 2018-10-23 PROCEDURE — 99232 SBSQ HOSP IP/OBS MODERATE 35: CPT

## 2018-10-23 RX ADMIN — RISPERIDONE 3 MILLIGRAM(S): 4 TABLET ORAL at 21:45

## 2018-10-23 RX ADMIN — RISPERIDONE 3 MILLIGRAM(S): 4 TABLET ORAL at 11:29

## 2018-10-23 RX ADMIN — AMLODIPINE BESYLATE 5 MILLIGRAM(S): 2.5 TABLET ORAL at 11:29

## 2018-10-24 PROCEDURE — 99232 SBSQ HOSP IP/OBS MODERATE 35: CPT

## 2018-10-24 RX ORDER — HALOPERIDOL DECANOATE 100 MG/ML
2 INJECTION INTRAMUSCULAR ONCE
Qty: 0 | Refills: 0 | Status: COMPLETED | OUTPATIENT
Start: 2018-10-24 | End: 2018-10-24

## 2018-10-24 RX ORDER — HALOPERIDOL DECANOATE 100 MG/ML
2 INJECTION INTRAMUSCULAR AT BEDTIME
Qty: 0 | Refills: 0 | Status: DISCONTINUED | OUTPATIENT
Start: 2018-10-24 | End: 2018-10-25

## 2018-10-24 RX ORDER — HALOPERIDOL DECANOATE 100 MG/ML
2 INJECTION INTRAMUSCULAR AT BEDTIME
Qty: 0 | Refills: 0 | Status: DISCONTINUED | OUTPATIENT
Start: 2018-10-24 | End: 2018-10-24

## 2018-10-24 RX ADMIN — RISPERIDONE 3 MILLIGRAM(S): 4 TABLET ORAL at 21:23

## 2018-10-24 RX ADMIN — RISPERIDONE 3 MILLIGRAM(S): 4 TABLET ORAL at 11:06

## 2018-10-24 RX ADMIN — AMLODIPINE BESYLATE 5 MILLIGRAM(S): 2.5 TABLET ORAL at 11:06

## 2018-10-25 PROCEDURE — 99232 SBSQ HOSP IP/OBS MODERATE 35: CPT

## 2018-10-25 RX ORDER — HALOPERIDOL DECANOATE 100 MG/ML
5 INJECTION INTRAMUSCULAR AT BEDTIME
Qty: 0 | Refills: 0 | Status: DISCONTINUED | OUTPATIENT
Start: 2018-10-25 | End: 2018-11-07

## 2018-10-25 RX ADMIN — AMLODIPINE BESYLATE 5 MILLIGRAM(S): 2.5 TABLET ORAL at 09:26

## 2018-10-25 RX ADMIN — RISPERIDONE 3 MILLIGRAM(S): 4 TABLET ORAL at 09:27

## 2018-10-25 RX ADMIN — HALOPERIDOL DECANOATE 5 MILLIGRAM(S): 100 INJECTION INTRAMUSCULAR at 21:37

## 2018-10-26 PROCEDURE — 99232 SBSQ HOSP IP/OBS MODERATE 35: CPT

## 2018-10-26 RX ORDER — BENZTROPINE MESYLATE 1 MG
0.5 TABLET ORAL
Qty: 0 | Refills: 0 | Status: DISCONTINUED | OUTPATIENT
Start: 2018-10-26 | End: 2018-11-09

## 2018-10-26 RX ORDER — HALOPERIDOL DECANOATE 100 MG/ML
5 INJECTION INTRAMUSCULAR DAILY
Qty: 0 | Refills: 0 | Status: DISCONTINUED | OUTPATIENT
Start: 2018-10-27 | End: 2018-11-09

## 2018-10-26 RX ADMIN — HALOPERIDOL DECANOATE 5 MILLIGRAM(S): 100 INJECTION INTRAMUSCULAR at 21:11

## 2018-10-26 RX ADMIN — AMLODIPINE BESYLATE 5 MILLIGRAM(S): 2.5 TABLET ORAL at 09:23

## 2018-10-26 RX ADMIN — RISPERIDONE 3 MILLIGRAM(S): 4 TABLET ORAL at 09:23

## 2018-10-26 RX ADMIN — Medication 0.5 MILLIGRAM(S): at 21:11

## 2018-10-27 RX ADMIN — HALOPERIDOL DECANOATE 5 MILLIGRAM(S): 100 INJECTION INTRAMUSCULAR at 09:59

## 2018-10-27 RX ADMIN — HALOPERIDOL DECANOATE 5 MILLIGRAM(S): 100 INJECTION INTRAMUSCULAR at 22:39

## 2018-10-27 RX ADMIN — Medication 0.5 MILLIGRAM(S): at 22:39

## 2018-10-27 RX ADMIN — Medication 0.5 MILLIGRAM(S): at 09:59

## 2018-10-28 RX ADMIN — HALOPERIDOL DECANOATE 5 MILLIGRAM(S): 100 INJECTION INTRAMUSCULAR at 22:09

## 2018-10-28 RX ADMIN — Medication 0.5 MILLIGRAM(S): at 22:09

## 2018-10-28 RX ADMIN — Medication 0.5 MILLIGRAM(S): at 10:35

## 2018-10-28 RX ADMIN — HALOPERIDOL DECANOATE 5 MILLIGRAM(S): 100 INJECTION INTRAMUSCULAR at 10:37

## 2018-10-29 PROCEDURE — 99232 SBSQ HOSP IP/OBS MODERATE 35: CPT

## 2018-10-29 RX ADMIN — Medication 0.5 MILLIGRAM(S): at 08:33

## 2018-10-29 RX ADMIN — Medication 0.5 MILLIGRAM(S): at 21:30

## 2018-10-29 RX ADMIN — AMLODIPINE BESYLATE 5 MILLIGRAM(S): 2.5 TABLET ORAL at 08:33

## 2018-10-29 RX ADMIN — HALOPERIDOL DECANOATE 5 MILLIGRAM(S): 100 INJECTION INTRAMUSCULAR at 08:33

## 2018-10-29 RX ADMIN — HALOPERIDOL DECANOATE 5 MILLIGRAM(S): 100 INJECTION INTRAMUSCULAR at 21:30

## 2018-10-30 PROCEDURE — 99232 SBSQ HOSP IP/OBS MODERATE 35: CPT

## 2018-10-30 RX ADMIN — Medication 0.5 MILLIGRAM(S): at 09:13

## 2018-10-30 RX ADMIN — Medication 0.5 MILLIGRAM(S): at 21:29

## 2018-10-30 RX ADMIN — HALOPERIDOL DECANOATE 5 MILLIGRAM(S): 100 INJECTION INTRAMUSCULAR at 09:13

## 2018-10-30 RX ADMIN — HALOPERIDOL DECANOATE 5 MILLIGRAM(S): 100 INJECTION INTRAMUSCULAR at 21:29

## 2018-10-30 RX ADMIN — AMLODIPINE BESYLATE 5 MILLIGRAM(S): 2.5 TABLET ORAL at 09:13

## 2018-10-31 PROCEDURE — 99232 SBSQ HOSP IP/OBS MODERATE 35: CPT

## 2018-10-31 RX ADMIN — HALOPERIDOL DECANOATE 5 MILLIGRAM(S): 100 INJECTION INTRAMUSCULAR at 09:27

## 2018-10-31 RX ADMIN — AMLODIPINE BESYLATE 5 MILLIGRAM(S): 2.5 TABLET ORAL at 09:27

## 2018-10-31 RX ADMIN — Medication 0.5 MILLIGRAM(S): at 09:27

## 2018-10-31 RX ADMIN — HALOPERIDOL DECANOATE 5 MILLIGRAM(S): 100 INJECTION INTRAMUSCULAR at 21:11

## 2018-10-31 RX ADMIN — Medication 0.5 MILLIGRAM(S): at 21:11

## 2018-11-01 PROCEDURE — 99232 SBSQ HOSP IP/OBS MODERATE 35: CPT

## 2018-11-01 RX ORDER — AMLODIPINE BESYLATE 2.5 MG/1
10 TABLET ORAL DAILY
Qty: 0 | Refills: 0 | Status: DISCONTINUED | OUTPATIENT
Start: 2018-11-02 | End: 2018-11-09

## 2018-11-01 RX ADMIN — Medication 0.5 MILLIGRAM(S): at 21:22

## 2018-11-01 RX ADMIN — Medication 0.5 MILLIGRAM(S): at 09:31

## 2018-11-01 RX ADMIN — HALOPERIDOL DECANOATE 5 MILLIGRAM(S): 100 INJECTION INTRAMUSCULAR at 21:22

## 2018-11-01 RX ADMIN — AMLODIPINE BESYLATE 5 MILLIGRAM(S): 2.5 TABLET ORAL at 09:31

## 2018-11-01 RX ADMIN — HALOPERIDOL DECANOATE 5 MILLIGRAM(S): 100 INJECTION INTRAMUSCULAR at 09:31

## 2018-11-02 PROCEDURE — 99232 SBSQ HOSP IP/OBS MODERATE 35: CPT

## 2018-11-02 RX ADMIN — HALOPERIDOL DECANOATE 5 MILLIGRAM(S): 100 INJECTION INTRAMUSCULAR at 08:23

## 2018-11-02 RX ADMIN — Medication 0.5 MILLIGRAM(S): at 08:23

## 2018-11-02 RX ADMIN — HALOPERIDOL DECANOATE 5 MILLIGRAM(S): 100 INJECTION INTRAMUSCULAR at 21:17

## 2018-11-02 RX ADMIN — Medication 0.5 MILLIGRAM(S): at 21:17

## 2018-11-02 RX ADMIN — AMLODIPINE BESYLATE 10 MILLIGRAM(S): 2.5 TABLET ORAL at 08:23

## 2018-11-03 RX ADMIN — Medication 0.5 MILLIGRAM(S): at 21:06

## 2018-11-03 RX ADMIN — HALOPERIDOL DECANOATE 5 MILLIGRAM(S): 100 INJECTION INTRAMUSCULAR at 21:06

## 2018-11-03 RX ADMIN — AMLODIPINE BESYLATE 10 MILLIGRAM(S): 2.5 TABLET ORAL at 10:05

## 2018-11-03 RX ADMIN — Medication 0.5 MILLIGRAM(S): at 10:06

## 2018-11-03 RX ADMIN — HALOPERIDOL DECANOATE 5 MILLIGRAM(S): 100 INJECTION INTRAMUSCULAR at 10:06

## 2018-11-04 RX ADMIN — Medication 0.5 MILLIGRAM(S): at 21:01

## 2018-11-04 RX ADMIN — Medication 0.5 MILLIGRAM(S): at 08:36

## 2018-11-04 RX ADMIN — HALOPERIDOL DECANOATE 5 MILLIGRAM(S): 100 INJECTION INTRAMUSCULAR at 08:36

## 2018-11-04 RX ADMIN — AMLODIPINE BESYLATE 10 MILLIGRAM(S): 2.5 TABLET ORAL at 13:49

## 2018-11-04 RX ADMIN — HALOPERIDOL DECANOATE 5 MILLIGRAM(S): 100 INJECTION INTRAMUSCULAR at 21:01

## 2018-11-05 PROCEDURE — 99232 SBSQ HOSP IP/OBS MODERATE 35: CPT

## 2018-11-05 RX ORDER — HALOPERIDOL DECANOATE 100 MG/ML
100 INJECTION INTRAMUSCULAR ONCE
Qty: 0 | Refills: 0 | Status: COMPLETED | OUTPATIENT
Start: 2018-11-05 | End: 2018-11-05

## 2018-11-05 RX ADMIN — HALOPERIDOL DECANOATE 5 MILLIGRAM(S): 100 INJECTION INTRAMUSCULAR at 09:41

## 2018-11-05 RX ADMIN — Medication 0.5 MILLIGRAM(S): at 21:33

## 2018-11-05 RX ADMIN — HALOPERIDOL DECANOATE 100 MILLIGRAM(S): 100 INJECTION INTRAMUSCULAR at 21:45

## 2018-11-05 RX ADMIN — AMLODIPINE BESYLATE 10 MILLIGRAM(S): 2.5 TABLET ORAL at 09:41

## 2018-11-05 RX ADMIN — HALOPERIDOL DECANOATE 5 MILLIGRAM(S): 100 INJECTION INTRAMUSCULAR at 21:33

## 2018-11-05 RX ADMIN — Medication 0.5 MILLIGRAM(S): at 09:41

## 2018-11-06 PROCEDURE — 99232 SBSQ HOSP IP/OBS MODERATE 35: CPT

## 2018-11-06 RX ADMIN — HALOPERIDOL DECANOATE 5 MILLIGRAM(S): 100 INJECTION INTRAMUSCULAR at 22:12

## 2018-11-06 RX ADMIN — Medication 0.5 MILLIGRAM(S): at 10:04

## 2018-11-06 RX ADMIN — AMLODIPINE BESYLATE 10 MILLIGRAM(S): 2.5 TABLET ORAL at 10:04

## 2018-11-06 RX ADMIN — Medication 0.5 MILLIGRAM(S): at 22:12

## 2018-11-06 RX ADMIN — HALOPERIDOL DECANOATE 5 MILLIGRAM(S): 100 INJECTION INTRAMUSCULAR at 10:04

## 2018-11-07 PROCEDURE — 99232 SBSQ HOSP IP/OBS MODERATE 35: CPT

## 2018-11-07 RX ORDER — HALOPERIDOL DECANOATE 100 MG/ML
25 INJECTION INTRAMUSCULAR ONCE
Qty: 0 | Refills: 0 | Status: COMPLETED | OUTPATIENT
Start: 2018-11-07 | End: 2018-11-07

## 2018-11-07 RX ADMIN — AMLODIPINE BESYLATE 10 MILLIGRAM(S): 2.5 TABLET ORAL at 08:36

## 2018-11-07 RX ADMIN — Medication 0.5 MILLIGRAM(S): at 21:27

## 2018-11-07 RX ADMIN — HALOPERIDOL DECANOATE 25 MILLIGRAM(S): 100 INJECTION INTRAMUSCULAR at 18:25

## 2018-11-07 RX ADMIN — Medication 0.5 MILLIGRAM(S): at 08:36

## 2018-11-07 RX ADMIN — HALOPERIDOL DECANOATE 5 MILLIGRAM(S): 100 INJECTION INTRAMUSCULAR at 08:36

## 2018-11-08 PROCEDURE — 99232 SBSQ HOSP IP/OBS MODERATE 35: CPT

## 2018-11-08 RX ORDER — AMLODIPINE BESYLATE 2.5 MG/1
1 TABLET ORAL
Qty: 30 | Refills: 0 | OUTPATIENT
Start: 2018-11-08 | End: 2018-12-07

## 2018-11-08 RX ORDER — HALOPERIDOL DECANOATE 100 MG/ML
1 INJECTION INTRAMUSCULAR
Qty: 5 | Refills: 0 | OUTPATIENT
Start: 2018-11-08 | End: 2018-11-12

## 2018-11-08 RX ORDER — BENZTROPINE MESYLATE 1 MG
1 TABLET ORAL
Qty: 60 | Refills: 0 | OUTPATIENT
Start: 2018-11-08 | End: 2018-12-07

## 2018-11-08 RX ADMIN — Medication 100 MILLIGRAM(S): at 22:36

## 2018-11-08 RX ADMIN — Medication 0.5 MILLIGRAM(S): at 10:29

## 2018-11-08 RX ADMIN — HALOPERIDOL DECANOATE 5 MILLIGRAM(S): 100 INJECTION INTRAMUSCULAR at 10:29

## 2018-11-08 RX ADMIN — Medication 0.5 MILLIGRAM(S): at 20:54

## 2018-11-09 VITALS — HEART RATE: 96 BPM

## 2018-11-09 PROCEDURE — 99232 SBSQ HOSP IP/OBS MODERATE 35: CPT

## 2018-11-09 RX ORDER — HALOPERIDOL DECANOATE 100 MG/ML
125 INJECTION INTRAMUSCULAR
Qty: 0 | Refills: 0 | COMMUNITY

## 2018-11-09 RX ADMIN — Medication 0.5 MILLIGRAM(S): at 09:06

## 2018-11-09 RX ADMIN — AMLODIPINE BESYLATE 10 MILLIGRAM(S): 2.5 TABLET ORAL at 09:06

## 2018-11-09 RX ADMIN — HALOPERIDOL DECANOATE 5 MILLIGRAM(S): 100 INJECTION INTRAMUSCULAR at 09:06

## 2024-05-07 NOTE — ED BEHAVIORAL HEALTH ASSESSMENT NOTE - NS ED BHA MED ROS HEMATOLOGIC LYMPHATIC
[Healthy Appearing] : healthy appearing [Well Nourished] : well nourished [Interactive] : interactive [Obese] : obese [Acanthosis Nigricans___] : acanthosis nigricans over [unfilled] [Normal Appearance] : normal appearance [Well formed] : well formed [Normally Set] : normally set [Abdomen Soft] : soft [Abdomen Tenderness] : non-tender [] : no hepatosplenomegaly [Normal] : normal  [Goiter] : no goiter [de-identified] : Darker skin on elbows; minimal to no acne; few thin hairs on lower abdomen.  Unable to assess